# Patient Record
Sex: MALE | Race: WHITE | NOT HISPANIC OR LATINO
[De-identification: names, ages, dates, MRNs, and addresses within clinical notes are randomized per-mention and may not be internally consistent; named-entity substitution may affect disease eponyms.]

---

## 2024-11-14 ENCOUNTER — DASHBOARD ENCOUNTERS (OUTPATIENT)
Age: 49
End: 2024-11-14

## 2024-11-14 ENCOUNTER — OFFICE VISIT (OUTPATIENT)
Dept: URBAN - METROPOLITAN AREA CLINIC 12 | Facility: CLINIC | Age: 49
End: 2024-11-14
Payer: COMMERCIAL

## 2024-11-14 VITALS
DIASTOLIC BLOOD PRESSURE: 90 MMHG | BODY MASS INDEX: 28.92 KG/M2 | HEIGHT: 70 IN | TEMPERATURE: 97.9 F | WEIGHT: 202 LBS | SYSTOLIC BLOOD PRESSURE: 132 MMHG | HEART RATE: 94 BPM

## 2024-11-14 DIAGNOSIS — L29.0 RECTAL ITCHING: ICD-10-CM

## 2024-11-14 DIAGNOSIS — Z12.11 COLON CANCER SCREENING: ICD-10-CM

## 2024-11-14 DIAGNOSIS — K64.4 EXTERNAL HEMORRHOIDS: ICD-10-CM

## 2024-11-14 PROBLEM — 23913003: Status: ACTIVE | Noted: 2024-11-14

## 2024-11-14 PROBLEM — 90446007: Status: ACTIVE | Noted: 2024-11-14

## 2024-11-14 PROBLEM — 305058001: Status: ACTIVE | Noted: 2024-11-14

## 2024-11-14 PROCEDURE — 99203 OFFICE O/P NEW LOW 30 MIN: CPT

## 2024-11-14 RX ORDER — CLOTRIMAZOLE AND BETAMETHASONE DIPROPIONATE 10; .5 MG/G; MG/G
1 APPLICATION CREAM TOPICAL TWICE A DAY
OUTPATIENT
Start: 2024-11-14

## 2024-11-14 NOTE — PHYSICAL EXAM GASTROINTESTINAL
Presence of a small, hard bump on perianal area with mild skin irritation. External hemorrhoids. No tenderness

## 2024-11-14 NOTE — HPI-TODAY'S VISIT:
Pt is a 48 yo male presents today for his first colon cancer screening and perianal itching/irritation. C/o persistent rectal itching for the past 1.5 year with a small palpable bump. He occasionally notices a trace of blood on the toilet paper after wiping, but not in the stool. Has tried Preparation H cream, without significant symptom relief. He has been using toilet paper followed by a wet wipe after bowel movements. He has regular bowel habits without diarrhea or constipation. There is no known family history of colorectal cancer.   He also has infrequent dysphagia. He feels food moving down slowly through his distal esophagus and wonders if it's d/t him eating too fast. Has been having to clear his throat more often. Denies GERD, heartburn, n/v, or epigastric pain.   The patient has no history of abdominal surgery, anesthesia, or issues with heart, lungs or kidneys.

## 2024-11-15 ENCOUNTER — TELEPHONE ENCOUNTER (OUTPATIENT)
Dept: URBAN - METROPOLITAN AREA CLINIC 12 | Facility: CLINIC | Age: 49
End: 2024-11-15

## 2024-11-15 RX ORDER — CLOTRIMAZOLE AND BETAMETHASONE DIPROPIONATE 10; .5 MG/G; MG/G
1 APPLICATION CREAM TOPICAL TWICE A DAY
Qty: 1 | Refills: 1
Start: 2024-11-14 | End: 2024-12-13

## 2025-01-30 ENCOUNTER — CLAIMS CREATED FROM THE CLAIM WINDOW (OUTPATIENT)
Dept: URBAN - METROPOLITAN AREA CLINIC 4 | Facility: CLINIC | Age: 50
End: 2025-01-30
Payer: COMMERCIAL

## 2025-01-30 ENCOUNTER — OFFICE VISIT (OUTPATIENT)
Dept: URBAN - METROPOLITAN AREA SURGERY CENTER 15 | Facility: SURGERY CENTER | Age: 50
End: 2025-01-30

## 2025-01-30 DIAGNOSIS — D12.5 BENIGN NEOPLASM OF SIGMOID COLON: ICD-10-CM

## 2025-01-30 DIAGNOSIS — D12.3 BENIGN NEOPLASM OF TRANSVERSE COLON: ICD-10-CM

## 2025-01-30 PROCEDURE — 88305 TISSUE EXAM BY PATHOLOGIST: CPT | Performed by: PATHOLOGY

## 2025-02-17 ENCOUNTER — OFFICE VISIT (OUTPATIENT)
Dept: URBAN - METROPOLITAN AREA CLINIC 12 | Facility: CLINIC | Age: 50
End: 2025-02-17
Payer: COMMERCIAL

## 2025-02-17 VITALS
HEIGHT: 70 IN | BODY MASS INDEX: 29.35 KG/M2 | WEIGHT: 205 LBS | HEART RATE: 84 BPM | SYSTOLIC BLOOD PRESSURE: 155 MMHG | DIASTOLIC BLOOD PRESSURE: 88 MMHG | TEMPERATURE: 97.9 F

## 2025-02-17 DIAGNOSIS — Z86.0101 HISTORY OF ADENOMATOUS POLYP OF COLON: ICD-10-CM

## 2025-02-17 DIAGNOSIS — L29.0 RECTAL ITCHING: ICD-10-CM

## 2025-02-17 PROCEDURE — 99213 OFFICE O/P EST LOW 20 MIN: CPT

## 2025-02-17 NOTE — HPI-TODAY'S VISIT:
Patient is a 49-year-old male presents today for colonoscopy follow-up.  He was seen by me on 11/14/2024 for colon cancer screening, rectal itching, and small external hemorrhoids. Clotrimazole-Betamethasone Cream provided.     Colon on 1/30/2025 showed skin tags on perianal exam, 1 TA in the sigmoid colon, 1 TA in the transverse colon, internal hemorrhoids.  Repeat in 5 years was advised.   Today patient reports feeling well, denies complications from the procedure. His rectal itching/discomfort has completley resolved after Clotrimazole-betamethasone cream for a few days. Denies any new concerns or symptoms.